# Patient Record
Sex: FEMALE | Race: WHITE
[De-identification: names, ages, dates, MRNs, and addresses within clinical notes are randomized per-mention and may not be internally consistent; named-entity substitution may affect disease eponyms.]

---

## 2017-07-15 ENCOUNTER — HOSPITAL ENCOUNTER (EMERGENCY)
Dept: HOSPITAL 74 - JER | Age: 57
Discharge: HOME | End: 2017-07-15
Payer: COMMERCIAL

## 2017-07-15 VITALS — BODY MASS INDEX: 35.3 KG/M2

## 2017-07-15 VITALS — HEART RATE: 82 BPM | DIASTOLIC BLOOD PRESSURE: 72 MMHG | SYSTOLIC BLOOD PRESSURE: 134 MMHG | TEMPERATURE: 98.6 F

## 2017-07-15 DIAGNOSIS — R42: ICD-10-CM

## 2017-07-15 DIAGNOSIS — Z79.84: ICD-10-CM

## 2017-07-15 DIAGNOSIS — I10: ICD-10-CM

## 2017-07-15 DIAGNOSIS — R51: Primary | ICD-10-CM

## 2017-07-15 DIAGNOSIS — E78.00: ICD-10-CM

## 2017-07-15 DIAGNOSIS — E11.9: ICD-10-CM

## 2017-07-15 DIAGNOSIS — K21.9: ICD-10-CM

## 2017-07-15 DIAGNOSIS — R11.2: ICD-10-CM

## 2017-07-15 LAB
ALBUMIN SERPL-MCNC: 4.2 G/DL (ref 3.4–5)
ALP SERPL-CCNC: 43 U/L (ref 45–117)
ALT SERPL-CCNC: 44 U/L (ref 12–78)
ANION GAP SERPL CALC-SCNC: 9 MMOL/L (ref 8–16)
APPEARANCE UR: CLEAR
AST SERPL-CCNC: 30 U/L (ref 15–37)
BASOPHILS # BLD: 0.5 % (ref 0–2)
BILIRUB SERPL-MCNC: 0.9 MG/DL (ref 0.2–1)
BILIRUB UR STRIP.AUTO-MCNC: NEGATIVE MG/DL
CALCIUM SERPL-MCNC: 10.3 MG/DL (ref 8.5–10.1)
CO2 SERPL-SCNC: 31 MMOL/L (ref 21–32)
COLOR UR: (no result)
CREAT SERPL-MCNC: 0.8 MG/DL (ref 0.55–1.02)
DEPRECATED RDW RBC AUTO: 13.1 % (ref 11.6–15.6)
EOSINOPHIL # BLD: 0.2 % (ref 0–4.5)
GLUCOSE SERPL-MCNC: 161 MG/DL (ref 74–106)
KETONES UR QL STRIP: NEGATIVE
LEUKOCYTE ESTERASE UR QL STRIP.AUTO: NEGATIVE
MCH RBC QN AUTO: 28.5 PG (ref 25.7–33.7)
MCHC RBC AUTO-ENTMCNC: 34.2 G/DL (ref 32–36)
MCV RBC: 83.5 FL (ref 80–96)
NEUTROPHILS # BLD: 76.5 % (ref 42.8–82.8)
NITRITE UR QL STRIP: NEGATIVE
PH UR: 5 [PH] (ref 5–8)
PLATELET # BLD AUTO: 226 K/MM3 (ref 134–434)
PMV BLD: 9.5 FL (ref 7.5–11.1)
PROT SERPL-MCNC: 8 G/DL (ref 6.4–8.2)
PROT UR QL STRIP: NEGATIVE
PROT UR QL STRIP: NEGATIVE
RBC # UR STRIP: NEGATIVE /UL
SP GR UR: 1.02 (ref 1–1.02)
TROPONIN I SERPL-MCNC: < 0.02 NG/ML (ref 0–0.05)
UROBILINOGEN UR STRIP-MCNC: NEGATIVE MG/DL (ref 0.2–1)
WBC # BLD AUTO: 12.2 K/MM3 (ref 4–10)

## 2017-07-15 PROCEDURE — 3E033NZ INTRODUCTION OF ANALGESICS, HYPNOTICS, SEDATIVES INTO PERIPHERAL VEIN, PERCUTANEOUS APPROACH: ICD-10-PCS

## 2017-07-15 PROCEDURE — 3E033GC INTRODUCTION OF OTHER THERAPEUTIC SUBSTANCE INTO PERIPHERAL VEIN, PERCUTANEOUS APPROACH: ICD-10-PCS

## 2017-07-15 NOTE — PDOC
History of Present Illness





- General


History Source: Patient


Exam Limitations: No Limitations





- History of Present Illness


Initial Comments: 


CHIEF COMPLAINT:  58 y/o afebrile female with PMH HTN, HLD, NIDDM, GERD c/o 

nausea, vomiting and lightheaded since yesterday. 





HISTORY OF PRESENT ILLNESS:  The patient states initially her symptoms started 

with a headache yesterday around lunch time and shortly afterwards the other 

symptoms began.  She states she is lightheaded mostly if she stands up. She 

states she cannot hold down even water.  She denies LOC, head trauma, f/c, neck 

pain, changes in vision/hearing, cough, hemoptysis, CP, SOB, back pain, 

hematuria, dysuria, diarrhea, constipation, melena, blood stools.  





PCP is Dr. Elfego Pan/Rosalind





Vital signs on arrival are within normal limits. 





REVIEW OF SYSTEMS:


GENERAL/CONSTITUTIONAL: No fever/chills. No weakness. No weight change.  +

lightheaded. 


HEAD, EYES, EARS, NOSE AND THROAT: No change in vision. No ear pain or 

discharge. No sore throat.


CARDIOVASCULAR: No chest pain or shortness of breath.


RESPIRATORY: No cough, wheezing, or hemoptysis.


GASTROINTESTINAL: +abdominal pain, nausea, vomiting.  


GENITOURINARY: No dysuria, frequency, or change in urination.


MUSCULOSKELETAL: No joint or muscle swelling or pain. No neck or back pain.


SKIN: No rash or easy bruising.


NEUROLOGIC: No headache, vertigo, loss of consciousness, or loss of sensation.








PHYSICAL EXAM:


GENERAL: The patient is awake, alert, and fully oriented, in no acute distress. 


HEAD: Normal with no signs of trauma.


NECK:  No midline c-spine TTP or step offs. 


ENT: Pupils equal, round and reactive to light, extraocular movements intact, 

sclera anicteric, conjunctiva clear.  Mucous membranes mildly dry.  Lips dry 

and cracked.


LUNGS: Clear to auscultation bilaterally. Normal excursion. No respiratory 

distress or use of accessory muscles.


CV: RRR, S1/S2, no MRG. Cap refill < 2 sec.


ABDOMEN: Soft, non-distended, TTP of epigastric region and RUQ with +Haile's 

sign.  Passive guarding.  No lower abdominal TTP.  No rebound or rigidity. 


EXTREMITIES: Normal range of motion, no edema.


NEUROLOGICAL: Normal speech, normal gait. CN II-XII grossly intact.


PSYCH: Normal mood, normal affect.


SKIN: Warm, dry, normal turgor, no rashes or lesions noted.

















<Karina Lau - Last Filed: 07/15/17 18:52>





<Kirti Chacko - Last Filed: 07/16/17 08:40>





- General


Chief Complaint: Nausea/Vomiting


Stated Complaint: NAUSEA, VOMITING


Time Seen by Provider: 07/15/17 13:55





Past History





<Karina Lau - Last Filed: 07/15/17 18:52>





<Kirti Chacko - Last Filed: 07/16/17 08:40>





- Past Medical History


Allergies/Adverse Reactions: 


 Allergies











Allergy/AdvReac Type Severity Reaction Status Date / Time


 


No Known Allergies Allergy   Verified 07/15/17 13:41











Home Medications: 


Ambulatory Orders





Atenolol [Tenormin -] 50 mg PO BID 07/15/17 


Atorvastatin Ca [Lipitor] 20 mg PO HS 07/15/17 


Famotidine [Pepcid] 20 mg PO BID #10 tablet 07/15/17 


Omeprazole 40 mg PO DAILY 07/15/17 


Ondansetron [Zofran Odt -] 4 mg SL TID #6 od.tablet 07/15/17 


Sitagliptin Phos/Metformin HCl [Janumet 50-1,000 mg Tablet] 50 - 1,000 mg PO 

DAILY 07/15/17 











*Physical Exam





- Vital Signs


 Last Vital Signs











Temp Pulse Resp BP Pulse Ox


 


 98.6 F   82   16   134/72   96 


 


 07/15/17 18:12  07/15/17 18:12  07/15/17 18:12  07/15/17 18:12  07/15/17 18:12














<Kirti Chacko - Last Filed: 07/16/17 08:40>





**Heart Score/ECG Review





- ECG Intrepretation


Comment:: 


Twelve-lead EKG was performed and reviewed by Dr. Chacko. There is normal 

sinus rhythm with a normal rate. The axis is normal. The intervals are normal. 

There are no ST or T wave abnormalities.





Impression: Normal twelve-lead EKG








<Karina Lau - Last Filed: 07/15/17 18:52>





ED Treatment Course





- LABORATORY


CBC & Chemistry Diagram: 


 07/15/17 14:20





 07/15/17 14:20





<JudiKarina - Last Filed: 07/15/17 18:52>





- LABORATORY


CBC & Chemistry Diagram: 


 07/15/17 14:20





 07/15/17 14:20





- ADDITIONAL ORDERS


Additional order review: 


 











  07/15/17





  14:20


 


RBC  5.02


 


MCV  83.5


 


MCHC  34.2


 


RDW  13.1


 


MPV  9.5


 


Neutrophils %  76.5


 


Lymphocytes %  18.6


 


Monocytes %  4.2


 


Eosinophils %  0.2


 


Basophils %  0.5














- Medications


Given in the ED: 


ED Medications














Discontinued Medications














Generic Name Dose Route Start Last Admin





  Trade Name Freq  PRN Reason Stop Dose Admin


 


Acetaminophen  1,000 mg 07/15/17 15:37 07/15/17 15:46





  Ofirmev Injection -  IVPB 07/15/17 15:38  1,000 mg





  ONCE ONE   Administration


 


Famotidine/Sodium Chloride  50 mls @ 100 mls/hr 07/15/17 14:09 07/15/17 14:33





  Pepcid 20 Mg Premixed Ivpb -  IVPB 07/15/17 14:38  100 mls/hr





  ONCE ONE   Administration


 


Sodium Chloride  1,000 mls @ 1,000 mls/hr 07/15/17 14:09 07/15/17 14:32





  Normal Saline -  IV 07/15/17 15:08  1,000 mls/hr





  ASDIR STA   Administration


 


Ondansetron HCl  4 mg 07/15/17 14:09 07/15/17 14:33





  Zofran Injection  IVPUSH 07/15/17 14:10  4 mg





  ONCE ONE   Administration














<Kirti Chacko - Last Filed: 07/16/17 08:40>





Medical Decision Making





- Medical Decision Making


A/P:  58 y/o afebrile female with n/v, headache and lightheaded for the past 24 

hours.  Plan is as follows:





1. Labs


2. EKG


3. CXR


4. Gallbladder ultrasound


5. IV fluids


6. IV zofran


7. IV pepcid








Gallbladder Ultrasound


IMPRESSION:  No gallstones identified. 





CXR


IMPRESSION:  No acute pathology. 





Potassium at 3.2.


All other labs unremarkable.


UA normal.


Pt still with headache.  Ordered IV tylenol








The patient states she now feels much better with no nausea.  She states her 

headache is now down to a 5/10, from a 10/10.  She was able to pass a PO 

challenge, drinking ice water and juice without any subsequent nausea or 

abdominal pain.  Will discharge to home with rx for zofran and pepcid.  

Suggested she slowly rehydrate and eat bland diet until symptoms improve.  The 

patient was instructed to return to the ER with any worsening or concerning 

symptoms. 








The patient verbalizes understanding of all instructions, has no further 

questions and is awaiting discharge.














<Karina Lau - Last Filed: 07/15/17 18:52>





*DC/Admit/Observation/Transfer





<Karina Lau - Last Filed: 07/15/17 18:52>





- Attestations


Physician Attestion: 


I reviewed the case with the mid-level practitioner and agree with the mid-

level practitioner's assessment, diagnosis and disposition.








<Kirti Chacko - Last Filed: 07/16/17 08:40>


Diagnosis at time of Disposition: 


 Lightheaded





Nausea and vomiting


Qualifiers:


 Vomiting type: unspecified Vomiting Intractability: non-intractable Qualified 

Code(s): R11.2 - Nausea with vomiting, unspecified





Headache


Qualifiers:


 Headache type: unspecified Headache chronicity pattern: acute headache 

Intractability: not intractable Qualified Code(s): R51 - Headache





- Discharge Dispostion


Disposition: HOME


Condition at time of disposition: Improved





- Prescriptions


Prescriptions: 


Famotidine [Pepcid] 20 mg PO BID #10 tablet


Ondansetron [Zofran Odt -] 4 mg SL TID #6 od.tablet





- Referrals


Referrals: 


Elfego Pan MD [Primary Care Provider] -  (Call Monday)





- Patient Instructions


Printed Discharge Instructions:  DI for Nausea -- Adult, DI for Vomiting -- 

Adult, DI for Headache, [place] Diet


Additional Instructions: 


Discharge Instructions:


-Prescriptions have been sent to your pharmacy for vomiting and abdominal pain.

  Take only if needed


-Please continue to drink plenty of fluids at home


-You can take Tylenol or Motrin for headaches at home


-Take your normal daily medications as prescribed


-Call your doctor on Monday to schedule follow up appointment


-Return to the ER with any worsening or concerning symptoms

## 2017-07-16 NOTE — EKG
Test Reason : 

Blood Pressure : ***/*** mmHG

Vent. Rate : 067 BPM     Atrial Rate : 067 BPM

   P-R Int : 148 ms          QRS Dur : 098 ms

    QT Int : 446 ms       P-R-T Axes : 024 004 004 degrees

   QTc Int : 471 ms

 

NORMAL SINUS RHYTHM

NORMAL ECG

WHEN COMPARED WITH ECG OF 18-APR-2005 04:30,

T WAVE INVERSION NOW EVIDENT IN ANTERIOR LEADS

Confirmed by STU PALACIOS, CHICHO ([id number]) on 7/16/2017 1:47:28 PM

 

Referred By:             Confirmed By:CHICHO PERAZA MD

## 2018-01-14 ENCOUNTER — HOSPITAL ENCOUNTER (EMERGENCY)
Dept: HOSPITAL 74 - JER | Age: 58
Discharge: HOME | End: 2018-01-14
Payer: COMMERCIAL

## 2018-01-14 VITALS — TEMPERATURE: 98.2 F

## 2018-01-14 VITALS — HEART RATE: 68 BPM | SYSTOLIC BLOOD PRESSURE: 131 MMHG | DIASTOLIC BLOOD PRESSURE: 72 MMHG

## 2018-01-14 VITALS — BODY MASS INDEX: 35.2 KG/M2

## 2018-01-14 DIAGNOSIS — Z79.84: ICD-10-CM

## 2018-01-14 DIAGNOSIS — N39.0: ICD-10-CM

## 2018-01-14 DIAGNOSIS — E11.65: Primary | ICD-10-CM

## 2018-01-14 DIAGNOSIS — R51: ICD-10-CM

## 2018-01-14 LAB
ALBUMIN SERPL-MCNC: 3.8 G/DL (ref 3.4–5)
ALP SERPL-CCNC: 59 U/L (ref 45–117)
ALT SERPL-CCNC: 50 U/L (ref 12–78)
ANION GAP SERPL CALC-SCNC: 10 MMOL/L (ref 8–16)
APPEARANCE UR: (no result)
AST SERPL-CCNC: 27 U/L (ref 15–37)
BACTERIA #/AREA URNS HPF: (no result) /HPF
BASOPHILS # BLD: 1 % (ref 0–2)
BILIRUB SERPL-MCNC: 0.8 MG/DL (ref 0.2–1)
BILIRUB UR STRIP.AUTO-MCNC: NEGATIVE MG/DL
BUN SERPL-MCNC: 26 MG/DL (ref 7–18)
CALCIUM SERPL-MCNC: 9.2 MG/DL (ref 8.5–10.1)
CHLORIDE SERPL-SCNC: 96 MMOL/L (ref 98–107)
CO2 SERPL-SCNC: 28 MMOL/L (ref 21–32)
COLOR UR: (no result)
CREAT SERPL-MCNC: 0.8 MG/DL (ref 0.55–1.02)
DEPRECATED RDW RBC AUTO: 12.9 % (ref 11.6–15.6)
EOSINOPHIL # BLD: 1 % (ref 0–4.5)
EPITH CASTS URNS QL MICRO: (no result) /HPF
GLUCOSE SERPL-MCNC: 247 MG/DL (ref 74–106)
HCT VFR BLD CALC: 42 % (ref 32.4–45.2)
HGB BLD-MCNC: 14.1 GM/DL (ref 10.7–15.3)
KETONES UR QL STRIP: NEGATIVE
LEUKOCYTE ESTERASE UR QL STRIP.AUTO: (no result)
LIPASE SERPL-CCNC: 187 U/L (ref 73–393)
LYMPHOCYTES # BLD: 26.3 % (ref 8–40)
MAGNESIUM SERPL-MCNC: 2.1 MG/DL (ref 1.8–2.4)
MCH RBC QN AUTO: 28.1 PG (ref 25.7–33.7)
MCHC RBC AUTO-ENTMCNC: 33.6 G/DL (ref 32–36)
MCV RBC: 83.6 FL (ref 80–96)
MONOCYTES # BLD AUTO: 8.6 % (ref 3.8–10.2)
MUCOUS THREADS URNS QL MICRO: (no result)
NEUTROPHILS # BLD: 63.1 % (ref 42.8–82.8)
NITRITE UR QL STRIP: NEGATIVE
PH UR: 5 [PH] (ref 5–8)
PLATELET # BLD AUTO: 327 K/MM3 (ref 134–434)
PMV BLD: 8.9 FL (ref 7.5–11.1)
POTASSIUM SERPLBLD-SCNC: 3.2 MMOL/L (ref 3.5–5.1)
PROT SERPL-MCNC: 7.9 G/DL (ref 6.4–8.2)
PROT UR QL STRIP: NEGATIVE
PROT UR QL STRIP: NEGATIVE
RBC # BLD AUTO: 5.02 M/MM3 (ref 3.6–5.2)
RBC # UR STRIP: NEGATIVE /UL
SODIUM SERPL-SCNC: 134 MMOL/L (ref 136–145)
SP GR UR: 1.01 (ref 1–1.03)
UROBILINOGEN UR STRIP-MCNC: NEGATIVE MG/DL (ref 0.2–1)
WBC # BLD AUTO: 9 K/MM3 (ref 4–10)

## 2018-01-14 PROCEDURE — 3E033GC INTRODUCTION OF OTHER THERAPEUTIC SUBSTANCE INTO PERIPHERAL VEIN, PERCUTANEOUS APPROACH: ICD-10-PCS

## 2018-01-14 PROCEDURE — 3E033NZ INTRODUCTION OF ANALGESICS, HYPNOTICS, SEDATIVES INTO PERIPHERAL VEIN, PERCUTANEOUS APPROACH: ICD-10-PCS

## 2018-01-14 PROCEDURE — 3E03329 INTRODUCTION OF OTHER ANTI-INFECTIVE INTO PERIPHERAL VEIN, PERCUTANEOUS APPROACH: ICD-10-PCS

## 2018-01-14 NOTE — PDOC
History of Present Illness





- General


History Source: Patient, Family, Old Records


Exam Limitations: No Limitations





- History of Present Illness


Initial Comments: 





01/14/18 11:23


The patient is a 57 year old female presenting with her family, with a 

significant past medical history of diabetes, GERD, HTN and HLD, who presents 

to the emergency department with elevated blood sugar levels (over 300), URI 

symptoms and headaches. She reports that she has noticed that she as been 

urinating much more frequently. She states that she usually gets headaches when 

her sugar level is high. She describes her headache as ranging from mild to 

moderate, without radiation. She states that the headache is accompanied with 

blurry vision. She reports that she was recently prescribed antibiotics (cipro) 

for her URI symptoms which has helped. She denies receiving the flu shot this 

year and notes that she has not been tested for the flu. 





The patient denies chest pain, shortness of breath, and dizziness. Denies fever

, chills, diarrhea and constipation. Denies dysuria, urgency and hematuria. 





Allergies: None 


Past surgical history: None reported 


Social history: No alcohol, tobacco or drug use reported 








<Pratik Hutson - Last Filed: 01/14/18 11:23>





<Ana Maria Pope - Last Filed: 01/14/18 13:23>





- General


Chief Complaint: Blood Sugar Problem


Stated Complaint: HEADACHES


Time Seen by Provider: 01/14/18 10:56





Past History





<Pratik Hutson - Last Filed: 01/14/18 11:23>





- Past Medical History


COPD: No


Diabetes: Yes


GI Disorders: Yes (ACID REFLUX.)


HTN: Yes


Hypercholesterolemia: Yes





- Suicide/Smoking/Psychosocial Hx


Smoking History: Never smoked


Hx Alcohol Use: No


Drug/Substance Use Hx: No


Substance Use Type: None





<Ana Maria Pope - Last Filed: 01/14/18 13:23>





- Past Medical History


Allergies/Adverse Reactions: 


 Allergies











Allergy/AdvReac Type Severity Reaction Status Date / Time


 


No Known Allergies Allergy   Verified 01/14/18 10:53











Home Medications: 


Ambulatory Orders





Atorvastatin Ca [Lipitor] 20 mg PO HS 07/15/17 


Albuterol Sulfate Inhaler - [Ventolin Hfa Inhaler -] 1 - 2 inh PO QID 01/14/18 


Aspirin [ASA -] 81 mg PO DAILY 01/14/18 


Atenolol/Chlorthalidone [Atenolol-Chlorthalidone 100-25] 1 each PO DAILY 01/14/ 18 


Azithromycin [Zithromax -] 250 mg PO DAILY 01/14/18 


Cephalexin [Keflex] 500 mg PO BID #14 capsule 01/14/18 


Metformin HCl [Glucophage -] 500 mg PO BID 01/14/18 











**Review of Systems





- Review of Systems


Able to Perform ROS?: Yes


Comments:: 





01/14/18 11:23


GENERAL/CONSTITUTIONAL: No fever or chills. No weakness.


HEAD, EYES, EARS, NOSE AND THROAT: (+) Blurry vision. No ear pain or discharge. 

No sore throat.-


CARDIOVASCULAR: No chest pain or shortness of breath


RESPIRATORY: No cough, wheezing, or hemoptysis.


GASTROINTESTINAL: No nausea, vomiting, diarrhea or constipation.


GENITOURINARY: (+) Frequency. No dysuria, change in urination.


MUSCULOSKELETAL: No joint or muscle swelling or pain. No neck or back pain.


SKIN: No rash


NEUROLOGIC: (+) Headache. No vertigo, loss of consciousness, or change in 

strength/sensation.


ENDOCRINE: No increased thirst. No abnormal weight change


HEMATOLOGIC/LYMPHATIC: No anemia, easy bleeding, or history of blood clots.


ALLERGIC/IMMUNOLOGIC: No hives or skin allergy.








<Pratik Hutson - Last Filed: 01/14/18 11:23>





*Physical Exam





- Vital Signs


 Last Vital Signs











Temp Pulse Resp BP Pulse Ox


 


 98.2 F   80   20   160/92   98 


 


 01/14/18 10:50  01/14/18 10:50  01/14/18 10:50  01/14/18 10:50  01/14/18 10:50














- Physical Exam


Comments: 





01/14/18 11:23


GENERAL: Awake, alert, and fully oriented, in no acute distress


HEAD: No signs of trauma, normocephalic, atraumatic 


EYES: PERRLA, EOMI, sclera anicteric, conjunctiva clear


ENT: Auricles normal inspection, hearing grossly normal, nares patent, 

oropharynx clear without


exudates. Moist mucosa


NECK: Normal ROM, supple, no lymphadenopathy, JVD, or masses


LUNGS: No distress, speaks full sentences, clear to auscultation bilaterally 


HEART: (+) Tachycardia.  normal S1 and S2, no murmurs, rubs or gallops, 

peripheral pulses normal and equal bilaterally. 


ABDOMEN: (+) Epigastric discomfort. Soft, normoactive bowel sounds.  No guarding

, no rebound.  No masses


EXTREMITIES : Normal inspection, Normal range of motion, no edema.  No clubbing 

or cyanosis. 


NEUROLOGICAL: Cranial nerves II through XII grossly intact.  Normal speech, no 

focal sensorimotor deficits  Finger to nose normal. All sensations intact. 


SKIN: Warm, Dry, normal turgor, no rashes or lesions noted. 








<Pratik Hutson - Last Filed: 01/14/18 11:23>





- Vital Signs


 Last Vital Signs











Temp Pulse Resp BP Pulse Ox


 


 98.2 F   80   20   160/92   98 


 


 01/14/18 10:50  01/14/18 10:50  01/14/18 10:50  01/14/18 10:50  01/14/18 10:50














<Ana Maria Pope - Last Filed: 01/14/18 13:23>





**Heart Score/ECG Review





- ECG Intrepretation


Comment:: 





01/14/18 12:50


sinus at 70, nl axis, nl interval, t wave inversions III which are nonspecific





<Ana Maria Pope - Last Filed: 01/14/18 13:23>





ED Treatment Course





- LABORATORY


CBC & Chemistry Diagram: 


 01/14/18 11:30





 01/14/18 11:30





<Ana Maria Pope - Last Filed: 01/14/18 13:23>





Medical Decision Making





- Medical Decision Making





01/14/18 11:16


a/p: 56yo female with ha and elevated bg


-no meningeal signs


-no signs/symptoms consistent with temporal arteritis


-no ttp over temporal region


-neuro intact


-will obtain head ct, labs, ekg, cxr


-cough causes cp, no cp without cough


-feeling better with zithromax


-will hydrate with ivf hydration and reglan/benadryl for headache


-hx of headaches that are similar when glucose is high


-ua given urinary freq


-will monitor and reassess


01/14/18 12:45


pt with urinary freq and leuks on ua - will start abx


ivf hydration running


pt drinking water as well


01/14/18 12:56


re-eval: pt feeling better. ha resolved


currently receiving abx for uti


discussed plans with the patient and need for abx


answered all questions


receiving ivf hydration


01/14/18 13:19


ha resolved.


tolerated po


will d/c to home


discussed all reasons to return to the ED and need for follow up with PMD. 


family at the bedside and agrees with the plan





<Ana Maria Pope - Last Filed: 01/14/18 13:23>





*DC/Admit/Observation/Transfer





- Attestations


Scribe Attestion: 





01/14/18 11:24





Documentation prepared by Pratik Hutson, acting as medical scribe for 

Ana Maria Pope DO





<Pratik Hutson - Last Filed: 01/14/18 11:23>





- Discharge Dispostion


Admit: No





- Attestations


Physician Attestion: 





01/14/18 13:23








I, Dr. Ana Maria Pope, DO, attest that this document has been prepared under 

my direction and personally reviewed by me in its entirety.   I further attest, 

that it accurately reflects all work, treatment, procedures and medical decision

-making performed by me.  





<An aMaria Pope - Last Filed: 01/14/18 13:23>


Diagnosis at time of Disposition: 


 Headache, UTI (urinary tract infection), Hyperglycemia








- Discharge Dispostion


Disposition: HOME


Condition at time of disposition: Stable





- Prescriptions


Prescriptions: 


Cephalexin [Keflex] 500 mg PO BID #14 capsule





- Referrals


Referrals: 


Carlito Pan MD [Primary Care Provider] - 





- Patient Instructions


Printed Discharge Instructions:  DI for Hyperglycemia -- Adult, DI for Urinary 

Tract Infection (UTI), DI for Headache


Additional Instructions: 


Please take all meds as prescribed. Please make an appointment to see the 

neurologist if your headache returns. Please make an appointment to see your 

PMD next week. Please return to the ED with any further complaints. 





- Post Discharge Activity

## 2018-01-15 NOTE — EKG
Test Reason : 

Blood Pressure : ***/*** mmHG

Vent. Rate : 070 BPM     Atrial Rate : 070 BPM

   P-R Int : 142 ms          QRS Dur : 094 ms

    QT Int : 416 ms       P-R-T Axes : 032 -03 -05 degrees

   QTc Int : 449 ms

 

NORMAL SINUS RHYTHM

NONSPECIFIC T WAVE ABNORMALITY

WHEN COMPARED WITH ECG OF 15-JUL-2017 14:44,

NO SIGNIFICANT CHANGE WAS FOUND

Confirmed by LAYO PORTILLO MD ([id number]) on 1/15/2018 11:25:58 AM

 

Referred By:             Confirmed By:LAYO PORTILLO MD

## 2022-10-31 NOTE — ASU PATIENT PROFILE, ADULT - NSICDXPASTMEDICALHX_GEN_ALL_CORE_FT
PAST MEDICAL HISTORY:  Diabetes     Elevated cholesterol     GERD (gastroesophageal reflux disease)     HTN (hypertension)

## 2022-10-31 NOTE — ASU PATIENT PROFILE, ADULT - NS PREOP UNDERSTANDS INFO
no solid food after 10pm. clear liquids allowed up to 5am, bring id and insurance card, no valuables or jewelry, wear comfortable clothing/yes

## 2022-10-31 NOTE — ASU PATIENT PROFILE, ADULT - FALL HARM RISK - CONCLUSION
Assessment and Intervention/Plan of Care/Vital Signs/Input and Output Universal Safety Interventions

## 2022-11-01 ENCOUNTER — OUTPATIENT (OUTPATIENT)
Dept: OUTPATIENT SERVICES | Facility: HOSPITAL | Age: 62
LOS: 1 days | Discharge: HOME | End: 2022-11-01

## 2022-11-01 VITALS
TEMPERATURE: 97 F | RESPIRATION RATE: 17 BRPM | HEART RATE: 65 BPM | DIASTOLIC BLOOD PRESSURE: 61 MMHG | OXYGEN SATURATION: 96 % | SYSTOLIC BLOOD PRESSURE: 125 MMHG

## 2022-11-01 VITALS
OXYGEN SATURATION: 97 % | DIASTOLIC BLOOD PRESSURE: 72 MMHG | HEART RATE: 77 BPM | HEIGHT: 61 IN | RESPIRATION RATE: 15 BRPM | SYSTOLIC BLOOD PRESSURE: 170 MMHG | WEIGHT: 174.61 LBS | TEMPERATURE: 98 F

## 2022-11-01 DIAGNOSIS — Z98.890 OTHER SPECIFIED POSTPROCEDURAL STATES: Chronic | ICD-10-CM

## 2022-11-01 DEVICE — STAPLER SEPTAL ENTACT: Type: IMPLANTABLE DEVICE | Status: FUNCTIONAL

## 2022-11-01 DEVICE — STENT SINUS DRUG ELUDING PROPEL CONTOUR: Type: IMPLANTABLE DEVICE | Status: FUNCTIONAL

## 2022-11-01 DEVICE — STENT DRUG ELUTING SINUS BIO ABSORB INTERSECT ENT PROPEL 23MM: Type: IMPLANTABLE DEVICE | Status: FUNCTIONAL

## 2022-11-01 RX ORDER — DIPHENHYDRAMINE HCL 50 MG
12.5 CAPSULE ORAL ONCE
Refills: 0 | Status: DISCONTINUED | OUTPATIENT
Start: 2022-11-01 | End: 2022-11-01

## 2022-11-01 RX ORDER — SITAGLIPTIN 50 MG/1
1 TABLET, FILM COATED ORAL
Qty: 0 | Refills: 0 | DISCHARGE

## 2022-11-01 RX ORDER — LOSARTAN POTASSIUM 100 MG/1
1 TABLET, FILM COATED ORAL
Qty: 0 | Refills: 0 | DISCHARGE

## 2022-11-01 RX ORDER — ACETAMINOPHEN 500 MG
1000 TABLET ORAL ONCE
Refills: 0 | Status: COMPLETED | OUTPATIENT
Start: 2022-11-01 | End: 2022-11-01

## 2022-11-01 RX ORDER — CELECOXIB 200 MG/1
200 CAPSULE ORAL ONCE
Refills: 0 | Status: DISCONTINUED | OUTPATIENT
Start: 2022-11-01 | End: 2022-11-01

## 2022-11-01 RX ORDER — SODIUM CHLORIDE 9 MG/ML
1000 INJECTION, SOLUTION INTRAVENOUS
Refills: 0 | Status: DISCONTINUED | OUTPATIENT
Start: 2022-11-01 | End: 2022-11-01

## 2022-11-01 RX ORDER — ATORVASTATIN CALCIUM 80 MG/1
1 TABLET, FILM COATED ORAL
Qty: 0 | Refills: 0 | DISCHARGE

## 2022-11-01 RX ORDER — ATENOLOL 25 MG/1
1 TABLET ORAL
Qty: 0 | Refills: 0 | DISCHARGE

## 2022-11-01 RX ORDER — ONDANSETRON 8 MG/1
4 TABLET, FILM COATED ORAL ONCE
Refills: 0 | Status: DISCONTINUED | OUTPATIENT
Start: 2022-11-01 | End: 2022-11-01

## 2022-11-01 RX ORDER — APREPITANT 80 MG/1
40 CAPSULE ORAL ONCE
Refills: 0 | Status: COMPLETED | OUTPATIENT
Start: 2022-11-01 | End: 2022-11-01

## 2022-11-01 RX ORDER — GLIMEPIRIDE 1 MG
1 TABLET ORAL
Qty: 0 | Refills: 0 | DISCHARGE

## 2022-11-01 RX ORDER — OXYCODONE HYDROCHLORIDE 5 MG/1
5 TABLET ORAL ONCE
Refills: 0 | Status: DISCONTINUED | OUTPATIENT
Start: 2022-11-01 | End: 2022-11-01

## 2022-11-01 RX ORDER — FENTANYL CITRATE 50 UG/ML
50 INJECTION INTRAVENOUS
Refills: 0 | Status: COMPLETED | OUTPATIENT
Start: 2022-11-01 | End: 2022-11-01

## 2022-11-01 RX ORDER — METFORMIN HYDROCHLORIDE 850 MG/1
1 TABLET ORAL
Qty: 0 | Refills: 0 | DISCHARGE

## 2022-11-01 RX ADMIN — APREPITANT 40 MILLIGRAM(S): 80 CAPSULE ORAL at 06:59

## 2022-11-01 RX ADMIN — FENTANYL CITRATE 50 MICROGRAM(S): 50 INJECTION INTRAVENOUS at 11:05

## 2022-11-01 RX ADMIN — FENTANYL CITRATE 50 MICROGRAM(S): 50 INJECTION INTRAVENOUS at 11:20

## 2022-11-01 RX ADMIN — SODIUM CHLORIDE 100 MILLILITER(S): 9 INJECTION, SOLUTION INTRAVENOUS at 11:05

## 2022-11-01 RX ADMIN — Medication 1000 MILLIGRAM(S): at 11:35

## 2022-11-01 RX ADMIN — Medication 400 MILLIGRAM(S): at 11:20

## 2022-11-01 NOTE — ASU DISCHARGE PLAN (ADULT/PEDIATRIC) - ASU DC SPECIAL INSTRUCTIONSFT
Use Budesonide Rinses 2 times per day. Use Nasal Saline Rinses (without Budesonide) 3 times per day. Total is 5 times per day.

## 2022-11-01 NOTE — ASU DISCHARGE PLAN (ADULT/PEDIATRIC) - NS MD DC FALL RISK RISK
For information on Fall & Injury Prevention, visit: [email or web address] OR  [email or web address] OR  https://www.cdc.gov/steadi/patient.html

## 2022-11-01 NOTE — BRIEF OPERATIVE NOTE - NSICDXBRIEFPOSTOP_GEN_ALL_CORE_FT
POST-OP DIAGNOSIS:  Chronic pansinusitis 01-Nov-2022 08:24:07  Refugio Guzman  Deviated septum 01-Nov-2022 08:24:14  Refugio Guzman  Nasal turbinate hypertrophy 01-Nov-2022 08:24:21  Refugio Guzman

## 2022-11-01 NOTE — BRIEF OPERATIVE NOTE - NSICDXBRIEFPREOP_GEN_ALL_CORE_FT
PRE-OP DIAGNOSIS:  Chronic pansinusitis 01-Nov-2022 08:23:19  Refugio Guzman  Deviated septum 01-Nov-2022 08:23:31  Refugio Guzman  Nasal turbinate hypertrophy 01-Nov-2022 08:23:39  Refugio Guzman

## (undated) DEVICE — SHAVER BLADE OLYMPUS DIEGO ELITE 4MM

## (undated) DEVICE — PACK SMR

## (undated) DEVICE — TUBING DIEGO DECLOG

## (undated) DEVICE — ELCTR BOVIE SUCTION 8FR 6"

## (undated) DEVICE — DRSG GAUZE SPONGE 2X2" STERILE

## (undated) DEVICE — PREP BETADINE SPONGE STICKS

## (undated) DEVICE — BRAINLAB HEADBAND DISP

## (undated) DEVICE — SUT CHROMIC 4-0 18" G-3

## (undated) DEVICE — SYR LUER LOK 50CC

## (undated) DEVICE — PETRI DISH MED 3.5"

## (undated) DEVICE — PACK RHINOPLASTY

## (undated) DEVICE — SOL IRR BAG NS 0.9% 1000ML

## (undated) DEVICE — Device

## (undated) DEVICE — BLADE MEDTRONIC ENT INFERIOR TURBINATE ROTATABLE STRAIGHT 2MM X11CM

## (undated) DEVICE — SPHERE MARKER FOR BRAIN LAB IMAGE (3 SPHERES)

## (undated) DEVICE — ACCLARENT SET INFLATION DEVICE

## (undated) DEVICE — SOL ANTI FOG

## (undated) DEVICE — SLV COMPRESSION KNEE MED

## (undated) DEVICE — GLV 7 PROTEXIS (WHITE)

## (undated) DEVICE — SOL IRR POUR NS 0.9% 1000ML

## (undated) DEVICE — DRAPE MAYO STAND 23"

## (undated) DEVICE — SUT PLAIN GUT 4-0 18" SC-1

## (undated) DEVICE — BEAVER BLADE MINI (ORANGE)

## (undated) DEVICE — WARMING BLANKET LOWER ADULT

## (undated) DEVICE — VENODYNE/SCD SLEEVE CALF MEDIUM